# Patient Record
Sex: MALE | Race: WHITE | NOT HISPANIC OR LATINO | URBAN - METROPOLITAN AREA
[De-identification: names, ages, dates, MRNs, and addresses within clinical notes are randomized per-mention and may not be internally consistent; named-entity substitution may affect disease eponyms.]

---

## 2021-12-01 NOTE — PROGRESS NOTES
SUBJECTIVE:                                                    Carloz Palomo is a 51 year old male who presents to clinic today for the following health issues:  New patient to me:     Back Pain      Duration: 6 days        Specific cause: lifting, turning/bending    Description:   Location of pain: low back left  Character of pain: dull ache  Pain radiation:radiates into the left leg  New numbness or weakness in legs, not attributed to pain:  no     Intensity: Currently 8-9/10, At its worst 10/10    History:   Pain interferes with job: YES, not when sitting  History of back problems: no prior back problems  Any previous MRI or X-rays: None  Sees a specialist for back pain:  No  Therapies tried without relief: cold    Alleviating factors:   Improved by: sitting      Precipitating factors:  Worsened by: moving around      Better with sitting worse with walking.   Was moving some items at home and reaching into crawl space.   No previous problems.     Elevated blood pressure at visit today:   Denies any chest pain or shortness of breath.  Denies any headaches dizziness.    Problem list and histories reviewed & adjusted, as indicated.  Additional history: as documented    There is no problem list on file for this patient.    History reviewed. No pertinent surgical history.    Social History     Tobacco Use     Smoking status: Never Smoker     Smokeless tobacco: Never Used   Substance Use Topics     Alcohol use: Yes     History reviewed. No pertinent family history.      Current Outpatient Medications   Medication Sig Dispense Refill     diclofenac (CATAFLAM) 50 MG tablet Take 1 tablet (50 mg) by mouth 3 times daily 90 tablet 0     lisinopril-hydrochlorothiazide (ZESTORETIC) 10-12.5 MG tablet Take 1 tablet by mouth daily 30 tablet 0     methocarbamol (ROBAXIN) 750 MG tablet Take 1 tablet (750 mg) by mouth 3 times daily 30 tablet 0     predniSONE (DELTASONE) 20 MG tablet Take 2 tablets (40 mg) by mouth daily for 5 days  10 tablet 0     No Known Allergies  Problem list, Medication list, Allergies, and Medical/Social/Surgical histories reviewed in [facility] and updated as appropriate.    ROS:  CV: NEGATIVE for chest pain, palpitations or peripheral edema  C: NEGATIVE for fever, chills, change in weight  E/M: NEGATIVE for ear, mouth and throat problems  R: NEGATIVE for significant cough or SOB    OBJECTIVE:                                                    BP (!) 220/120   Pulse 80   Temp 97.2  F (36.2  C) (Tympanic)   Wt 121 kg (266 lb 12.8 oz)   SpO2 98%   There is no height or weight on file to calculate BMI.   GENERAL: healthy, alert, well nourished, well hydrated, no distress  RESP: lungs clear to auscultation - no rales, no rhonchi, no wheezes  CV: regular rates and rhythm, normal S1 S2, no S3 or S4 and no murmur, no click or rub -  ABDOMEN: soft, no tenderness, no  hepatosplenomegaly, no masses, normal bowel sounds  Lumber/Thoracic Spine Exam: Tender:  None, but describes a left lower paraspinal musculature region and injured his left buttock that the pain is coming from.  Range of Motion:  full range of motion doni lower extremities   Strength:  5/5 doni lower extremities   Special tests:  positive left straight leg raise  Hip Exam: Hip ROM full       X-ray lumbar: neg. pending radiology  EKG: NSR, none to compare  Labs pending        ASSESSMENT/PLAN:                                                        ICD-10-CM    1. Acute left-sided low back pain with left-sided sciatica  M54.42 predniSONE (DELTASONE) 20 MG tablet     methocarbamol (ROBAXIN) 750 MG tablet     diclofenac (CATAFLAM) 50 MG tablet     ketorolac (TORADOL) injection 60 mg     XR Lumbar Spine 2/3 Views     [facility] PT and Hand Referral   2. Hypertension goal BP (blood pressure) < 140/90  I10 lisinopril-hydrochlorothiazide (ZESTORETIC) 10-12.5 MG tablet     Lipid panel reflex to direct LDL Fasting     Comprehensive metabolic panel (BMP + Alb, Alk Phos, ALT, AST, Total.  Bili, TP)     CBC with platelets and differential     TSH with free T4 reflex     Lipid panel reflex to direct LDL Fasting     Comprehensive metabolic panel (BMP + Alb, Alk Phos, ALT, AST, Total. Bili, TP)     CBC with platelets and differential     TSH with free T4 reflex     EKG 12-lead complete w/read - Clinics   1.  We talked about physical therapy.  We talked about the use of methocarbamol, diclofenac, prednisone.  We talked about warning signs side effects.  He was given a Toradol shot in clinic today.  ice or cold packs 20 minutes every 2-3 hrs as needed to relieve pain and swelling, for the first 2 days. Then can apply heat 20 minutes every 2-3 hrs (avoid sleeping on heating pad) there after as needed.   Tylenol can help with pain also.    Active range of motion exercises encouraged  Activity modification trying to avoid activities that cause you pain.   2.  His blood pressure is high today.  Labs are pending.  We talked about warning signs and side effects of high blood pressure.  We will start him on medications today.  We will follow-up in 2 weeks.    Alvin Murillo PA-C  [facility]

## 2021-12-02 ENCOUNTER — ANCILLARY PROCEDURE (OUTPATIENT)
Dept: GENERAL RADIOLOGY | Facility: CLINIC | Age: 51
End: 2021-12-02
Attending: PHYSICIAN ASSISTANT
Payer: COMMERCIAL

## 2021-12-02 ENCOUNTER — OFFICE VISIT (OUTPATIENT)
Dept: FAMILY MEDICINE | Facility: CLINIC | Age: 51
End: 2021-12-02
Payer: COMMERCIAL

## 2021-12-02 VITALS
WEIGHT: 266.8 LBS | TEMPERATURE: 97.2 F | OXYGEN SATURATION: 98 % | HEART RATE: 80 BPM | SYSTOLIC BLOOD PRESSURE: 220 MMHG | DIASTOLIC BLOOD PRESSURE: 120 MMHG

## 2021-12-02 DIAGNOSIS — M54.42 ACUTE LEFT-SIDED LOW BACK PAIN WITH LEFT-SIDED SCIATICA: Primary | ICD-10-CM

## 2021-12-02 DIAGNOSIS — I10 HYPERTENSION GOAL BP (BLOOD PRESSURE) < 140/90: ICD-10-CM

## 2021-12-02 LAB
ALBUMIN SERPL-MCNC: 4.5 G/DL (ref 3.4–5)
ALP SERPL-CCNC: 78 U/L (ref 40–150)
ALT SERPL W P-5'-P-CCNC: 46 U/L (ref 0–70)
ANION GAP SERPL CALCULATED.3IONS-SCNC: 5 MMOL/L (ref 3–14)
AST SERPL W P-5'-P-CCNC: 29 U/L (ref 0–45)
BASOPHILS # BLD AUTO: 0.1 10E3/UL (ref 0–0.2)
BASOPHILS NFR BLD AUTO: 1 %
BILIRUB SERPL-MCNC: 0.8 MG/DL (ref 0.2–1.3)
BUN SERPL-MCNC: 15 MG/DL (ref 7–30)
CALCIUM SERPL-MCNC: 8.8 MG/DL (ref 8.5–10.1)
CHLORIDE BLD-SCNC: 107 MMOL/L (ref 94–109)
CHOLEST SERPL-MCNC: 220 MG/DL
CO2 SERPL-SCNC: 28 MMOL/L (ref 20–32)
CREAT SERPL-MCNC: 1 MG/DL (ref 0.66–1.25)
EOSINOPHIL # BLD AUTO: 0.3 10E3/UL (ref 0–0.7)
EOSINOPHIL NFR BLD AUTO: 4 %
ERYTHROCYTE [DISTWIDTH] IN BLOOD BY AUTOMATED COUNT: 13.5 % (ref 10–15)
FASTING STATUS PATIENT QL REPORTED: YES
GFR SERPL CREATININE-BSD FRML MDRD: 86 ML/MIN/1.73M2
GLUCOSE BLD-MCNC: 109 MG/DL (ref 70–99)
HCT VFR BLD AUTO: 47.4 % (ref 40–53)
HDLC SERPL-MCNC: 57 MG/DL
HGB BLD-MCNC: 16 G/DL (ref 13.3–17.7)
LDLC SERPL CALC-MCNC: 140 MG/DL
LYMPHOCYTES # BLD AUTO: 1.5 10E3/UL (ref 0.8–5.3)
LYMPHOCYTES NFR BLD AUTO: 21 %
MCH RBC QN AUTO: 28.8 PG (ref 26.5–33)
MCHC RBC AUTO-ENTMCNC: 33.8 G/DL (ref 31.5–36.5)
MCV RBC AUTO: 85 FL (ref 78–100)
MONOCYTES # BLD AUTO: 0.6 10E3/UL (ref 0–1.3)
MONOCYTES NFR BLD AUTO: 8 %
NEUTROPHILS # BLD AUTO: 4.8 10E3/UL (ref 1.6–8.3)
NEUTROPHILS NFR BLD AUTO: 67 %
NONHDLC SERPL-MCNC: 163 MG/DL
PLATELET # BLD AUTO: 202 10E3/UL (ref 150–450)
POTASSIUM BLD-SCNC: 4.4 MMOL/L (ref 3.4–5.3)
PROT SERPL-MCNC: 7.6 G/DL (ref 6.8–8.8)
RBC # BLD AUTO: 5.56 10E6/UL (ref 4.4–5.9)
SODIUM SERPL-SCNC: 140 MMOL/L (ref 133–144)
TRIGL SERPL-MCNC: 114 MG/DL
TSH SERPL DL<=0.005 MIU/L-ACNC: 1.52 MU/L (ref 0.4–4)
WBC # BLD AUTO: 7.1 10E3/UL (ref 4–11)

## 2021-12-02 PROCEDURE — 93000 ELECTROCARDIOGRAM COMPLETE: CPT | Performed by: PHYSICIAN ASSISTANT

## 2021-12-02 PROCEDURE — 36415 COLL VENOUS BLD VENIPUNCTURE: CPT | Performed by: PHYSICIAN ASSISTANT

## 2021-12-02 PROCEDURE — 96372 THER/PROPH/DIAG INJ SC/IM: CPT | Performed by: PHYSICIAN ASSISTANT

## 2021-12-02 PROCEDURE — 72100 X-RAY EXAM L-S SPINE 2/3 VWS: CPT | Performed by: RADIOLOGY

## 2021-12-02 PROCEDURE — 80061 LIPID PANEL: CPT | Performed by: PHYSICIAN ASSISTANT

## 2021-12-02 PROCEDURE — 99204 OFFICE O/P NEW MOD 45 MIN: CPT | Mod: 25 | Performed by: PHYSICIAN ASSISTANT

## 2021-12-02 PROCEDURE — 80050 GENERAL HEALTH PANEL: CPT | Performed by: PHYSICIAN ASSISTANT

## 2021-12-02 RX ORDER — PREDNISONE 20 MG/1
40 TABLET ORAL DAILY
Qty: 10 TABLET | Refills: 0 | Status: SHIPPED | OUTPATIENT
Start: 2021-12-02 | End: 2021-12-07

## 2021-12-02 RX ORDER — KETOROLAC TROMETHAMINE 30 MG/ML
60 INJECTION, SOLUTION INTRAMUSCULAR; INTRAVENOUS ONCE
Status: COMPLETED | OUTPATIENT
Start: 2021-12-02 | End: 2021-12-02

## 2021-12-02 RX ORDER — DICLOFENAC POTASSIUM 50 MG/1
50 TABLET, FILM COATED ORAL 3 TIMES DAILY
Qty: 90 TABLET | Refills: 0 | Status: SHIPPED | OUTPATIENT
Start: 2021-12-02 | End: 2022-01-01

## 2021-12-02 RX ORDER — METHOCARBAMOL 750 MG/1
750 TABLET, FILM COATED ORAL 3 TIMES DAILY
Qty: 30 TABLET | Refills: 0 | Status: SHIPPED | OUTPATIENT
Start: 2021-12-02 | End: 2021-12-13

## 2021-12-02 RX ORDER — LISINOPRIL/HYDROCHLOROTHIAZIDE 10-12.5 MG
1 TABLET ORAL DAILY
Qty: 30 TABLET | Refills: 0 | Status: SHIPPED | OUTPATIENT
Start: 2021-12-02 | End: 2022-01-04

## 2021-12-02 RX ADMIN — KETOROLAC TROMETHAMINE 60 MG: 30 INJECTION, SOLUTION INTRAMUSCULAR; INTRAVENOUS at 07:41

## 2021-12-02 NOTE — RESULT ENCOUNTER NOTE
Mr. [place],    All of your labs were normal/near normal for you.    Please contact the clinic if you have additional questions.  Thank you.    Sincerely,    Alvin Murillo PA-C

## 2021-12-12 ENCOUNTER — HEALTH MAINTENANCE LETTER (OUTPATIENT)
Age: 51
End: 2021-12-12

## 2021-12-13 ENCOUNTER — MYC REFILL (OUTPATIENT)
Dept: FAMILY MEDICINE | Facility: CLINIC | Age: 51
End: 2021-12-13
Payer: COMMERCIAL

## 2021-12-13 DIAGNOSIS — M54.42 ACUTE LEFT-SIDED LOW BACK PAIN WITH LEFT-SIDED SCIATICA: ICD-10-CM

## 2021-12-13 NOTE — TELEPHONE ENCOUNTER
Routing refill request to provider for review/approval because:  Drug not on the [facility] refill protocol     Consuelo RAMN, RN

## 2021-12-14 RX ORDER — METHOCARBAMOL 750 MG/1
750 TABLET, FILM COATED ORAL 3 TIMES DAILY
Qty: 30 TABLET | Refills: 0 | Status: SHIPPED | OUTPATIENT
Start: 2021-12-14

## 2021-12-15 NOTE — PROGRESS NOTES
Assessment & Plan       ICD-10-CM    1. Hypertension goal BP (blood pressure) < 140/90  I10 lisinopril-hydrochlorothiazide (ZESTORETIC) 20-25 MG tablet   2. Acute left-sided low back pain with left-sided sciatica  M54.42 MR Lumbar Spine w/o Contrast     traMADol (ULTRAM) 50 MG tablet   1.  We will increase his lisinopril hydrochlorothiazide to 20/25 daily.  Recheck his blood pressure in 2 weeks.  Warning signs side effects were discussed.  2.  We will get an MRI of his lumbar spine at patient's request.  He has physical therapy scheduled next week we will have him continue that.  He was given a prescription for tramadol to use.  Warning signs were discussed.    Return in about 2 weeks (around 12/30/2021) for recheck.    TONY Ochoa [facility]    Leena Duron is a 51 year old who presents for the following health issues     History of Present Illness       Back Pain:  He presents for follow up of back pain. Patient's back pain is a new problem.    Original cause of back pain: turning/bending  First noticed back pain: 1-4 weeks ago  Patient feels back pain: constantlyLocation of back pain:  Left lower back and left hip  Description of back pain: dull ache  Back pain spreads: left knee    Since patient first noticed back pain, pain is: unchanged  Does back pain interfere with his job:  No  On a scale of 1-10 (10 being the worst), patient describes pain as:  8  What makes back pain worse: lying down and standing  Acupuncture: not tried  Acetaminophen: not tried  Activity or exercise: not tried  Chiropractor:  Helpful  Cold: helpful  Heat: not tried  Massage: not tried  Muscle relaxants: helpful  NSAIDS: helpful  Opioids: not tried  Physical Therapy: not tried  Rest: not helpful  Steroid Injection: not tried  Stretching: not helpful  Surgery: not tried  TENS unit: not tried  Topical pain relievers: not tried  Other healthcare providers patient is seeing for back pain:  Chiropractor    Hypertension: He presents for follow up of hypertension.  He does not check blood pressure  regularly outside of the clinic. Outside blood pressures have been over 140/90. He does not follow a low salt diet.     He eats 2-3 servings of fruits and vegetables daily.He consumes 3 sweetened beverage(s) daily.He exercises with enough effort to increase his heart rate 9 or less minutes per day.  He exercises with enough effort to increase his heart rate 3 or less days per week.   He is taking medications regularly.     He states he has been taking the medication as prescribed and has taken little better but continues to have a lot of pain down his left leg.  He states the lower back pain is gotten much better.  He states he can hardly walk because the pain radiates down his left leg and stops right as needed.  He states he is having a lot of pain hard time sleeping only getting a couple hours a night.    Review of Systems   Constitutional, HEENT, cardiovascular, pulmonary, gi and gu systems are negative, except as otherwise noted.      Objective    BP (!) 140/100   Pulse 73   Temp 97.7  F (36.5  C) (Tympanic)   Wt 116.6 kg (257 lb)   SpO2 98%   There is no height or weight on file to calculate BMI.  Physical Exam   GENERAL: healthy, alert and no distress-appears in a lot of pain.  He is unable to sit still in his seat yes that shift a lot.  RESP: lungs clear to auscultation - no rales, rhonchi or wheezes  CV: regular rate and rhythm, normal S1 S2, no S3 or S4, no murmur, click or rub, no peripheral edema and peripheral pulses strong  ABDOMEN: soft, nontender, no hepatosplenomegaly, no masses and bowel sounds normal  On back exam he has full range of motion with rotation of his spine with no pain.  He does not elicit any tenderness.  He has pain with laying on his back.  He has full range of motion bilateral lower extremities with 5-5 strength.  He has positive straight leg raise on the left.  Calves  are soft nontender neuro vas intact distally.  He has stiffness bilaterally in his internal extra rotation of his femur.  He has a negative exam of his knee no tenderness ligaments stable valgus varus and Lachman's.  He has a negative Porfirio's.

## 2021-12-16 ENCOUNTER — OFFICE VISIT (OUTPATIENT)
Dept: FAMILY MEDICINE | Facility: CLINIC | Age: 51
End: 2021-12-16
Payer: COMMERCIAL

## 2021-12-16 VITALS
TEMPERATURE: 97.7 F | DIASTOLIC BLOOD PRESSURE: 100 MMHG | SYSTOLIC BLOOD PRESSURE: 140 MMHG | OXYGEN SATURATION: 98 % | WEIGHT: 257 LBS | HEART RATE: 73 BPM

## 2021-12-16 DIAGNOSIS — M54.42 ACUTE LEFT-SIDED LOW BACK PAIN WITH LEFT-SIDED SCIATICA: ICD-10-CM

## 2021-12-16 DIAGNOSIS — I10 HYPERTENSION GOAL BP (BLOOD PRESSURE) < 140/90: Primary | ICD-10-CM

## 2021-12-16 PROCEDURE — 99214 OFFICE O/P EST MOD 30 MIN: CPT | Performed by: PHYSICIAN ASSISTANT

## 2021-12-16 RX ORDER — TRAMADOL HYDROCHLORIDE 50 MG/1
50 TABLET ORAL 3 TIMES DAILY PRN
Qty: 10 TABLET | Refills: 0 | Status: SHIPPED | OUTPATIENT
Start: 2021-12-16 | End: 2021-12-19

## 2021-12-16 RX ORDER — LISINOPRIL AND HYDROCHLOROTHIAZIDE 20; 25 MG/1; MG/1
1 TABLET ORAL DAILY
Qty: 30 TABLET | Refills: 0 | Status: SHIPPED | OUTPATIENT
Start: 2021-12-16 | End: 2022-01-07

## 2021-12-16 ASSESSMENT — PAIN SCALES - GENERAL: PAINLEVEL: EXTREME PAIN (8)

## 2021-12-19 NOTE — PROGRESS NOTES
Physical Therapy Initial Examination/Evaluation  December 20, 2021    Therapist Impression: Carloz is a 51 year old year old male referred to physical therapy by Alvin Murillo PA-C for treatment of acute sided low back pain with left sided sciatica. Patient presents to physical therapy with c/o low back pain with radicular symptoms. Signs and symptoms are consistent with sub-acute lumbar radiculopathy. Due to these impairments, patient is unable to walk desired distance, stand up straight, stand prolonged periods, and ambulate up stairs without fear of falling. Patient will benefit from skilled PT in order to address impairments/limitations in order to return patient to goals and prior level of function.       Subjective:  Presenting Complaint Left sided low back pain, hip pain down to knee   Mechanism of Injury  Crawling in crawl space    DOI (onset)/ DOS 11/29/21   Functional Limitations Standing, walking, stairs, sleeping   Notable PMH See Epic chart    Prior treatment Ibuprofen and Perscription anti-inflammatory medications  fair   Prior Imaging  MRI scheduled for tomorrow 12/20/21    X-ray:                                                                  IMPRESSION: Vertebral body heights are normal. Posterior alignment is normal. Minimal degenerative changes noted.       Pain/Presentation    Pain Level   Rest: 8/10  ; Activity: 10/10   Location   left low back and left leg down to knee    Frequency Constant; worse at times but never goes away   Described as Dull ache     Alleviated by  Ibuprofen and Perscription anti-inflammatory medications, sitting    Progression of Sx Gradually getting better.   Aggravating Factors  Turning, bending, lifting, laying down, standing    Sleeping  Interrupted due to current issue- currently sleeping on couch instead of bed       Social Factors/Lifestyle  Occupation and Duties    prolonged sitting, keyboarding/computer use, repetitive tasks   Barriers at home/work None  as reported by patient   Medications ibuprofen (3x/day) ; muscle relaxer    Current HEP/Exercise Walking around golf course (currently able to walk 1 block currently)   Patient Reported Health Good      Patient Goals:  1) get back to walking as normal  2) no pain with standing up straight   3) decrease back pain so he can golf in spring     Other factors/PMH that may impact care: na        Patient Health History  Carloz Palomo being seen for Back hip and knee pain.     Problem began: 11/29/2021.   Problem occurred: Moving items in a crawl space   Pain is reported as 8/10 on pain scale.  General health as reported by patient is good.  Pertinent medical history includes: high blood pressure and overweight.     Medical allergies: none.   Surgeries include:  None.    Current medications:  Anti-inflammatory, high blood pressure medication and muscle relaxants.       Primary job tasks include:  Computer work and prolonged sitting.                                    Lumbar Spine Evaluation  Posture: slumped posturing; increase lumbar and thoracic kyphosis in sitting and standing; unable to stand upright due to pain       Dermatome Testing: Negative      Myotome Testing:  Negative    Motor Deficit:  Myotomes L R   L1-2 (hip flexion) 3/5 5/5   L3 (knee extension) 4/5 5/5   L4 (ankle DF) 5/5 5/5   L5 (g. toe ext) 5/5 5/5   S1 (ankle PF or knee flex) 5/5 5/5      Sensory Deficit, Reflexes: WNL    Dynamic Movement Screen  Single leg stance observations: No significant findings for eyes open/closed  Double limb squat observations: Anterior knee translation, Hip internal rotation, Increased trunk lean and Narrow REN  Single limb squat observations: Not assessed  Gait: anterior trunk lean through all phases of gait, decreased hip extension    Hip Joint Screen Negative    Trunk Range of Motion  Movement Loss Major Moderate Minimal Nil Pain   Flexion   x  Increases left leg radicular sx   Extension x    Unable to get to neutral  before sx   Rotation R   x  Left sided back pain    Rotation L   x  Left sided back pain      Tested Movements  Directional Preference: tendency toward extension preference in supine   Repeated Extension Test: did not perform due to radicular symptoms; will look at next visit       Trunk Strength  Difficulty with TrA activation in supine with cueing       Neural Tension:   Slump and SLR next visit if symptoms have calmed down     Special Tests  Spring testing: unable to assess as prone position was not tolerated       Palpation:  Moderate tenderness to palpation at L2-4 paraspinals in standing; unable to fully assess as patient did not tolerate prone positioning         System    Physical Exam    General     ROS    Assessment/Plan:    Patient is a 51 year old male with lumbar complaints.    Patient has the following significant findings with corresponding treatment plan.                Diagnosis 1:  Lumbar radiculopathy  Pain -  hot/cold therapy, electric stimulation, mechanical traction and manual therapy  Decreased ROM/flexibility - manual therapy and therapeutic exercise  Decreased joint mobility - manual therapy and therapeutic exercise  Decreased strength - therapeutic exercise and therapeutic activities  Impaired gait - gait training  Impaired muscle performance - neuro re-education  Decreased function - therapeutic activities  Impaired posture - neuro re-education    Therapy Evaluation Codes:     Cumulative Therapy Evaluation is: Low complexity.    Previous and current functional limitations:  (See Goal Flow Sheet for this information)    Short term and Long term goals: (See Goal Flow Sheet for this information)     Communication ability:  Patient appears to be able to clearly communicate and understand verbal and written communication and follow directions correctly.  Treatment Explanation - The following has been discussed with the patient:   RX ordered/plan of care  Anticipated outcomes  Possible risks and  side effects  This patient would benefit from PT intervention to resume normal activities.   Rehab potential is good.    Frequency:  1 X week, once daily  Duration:  for 8 weeks  Discharge Plan:  Achieve all LTG.  Independent in home treatment program.  Reach maximal therapeutic benefit.    Please refer to the daily flowsheet for treatment today, total treatment time and time spent performing 1:1 timed codes.

## 2021-12-20 ENCOUNTER — THERAPY VISIT (OUTPATIENT)
Dept: PHYSICAL THERAPY | Facility: CLINIC | Age: 51
End: 2021-12-20
Attending: PHYSICIAN ASSISTANT
Payer: COMMERCIAL

## 2021-12-20 DIAGNOSIS — M54.42 ACUTE LEFT-SIDED LOW BACK PAIN WITH LEFT-SIDED SCIATICA: ICD-10-CM

## 2021-12-20 PROCEDURE — 97161 PT EVAL LOW COMPLEX 20 MIN: CPT | Mod: GP

## 2021-12-20 PROCEDURE — 97110 THERAPEUTIC EXERCISES: CPT | Mod: GP

## 2021-12-21 ENCOUNTER — ANCILLARY PROCEDURE (OUTPATIENT)
Dept: MRI IMAGING | Facility: CLINIC | Age: 51
End: 2021-12-21
Attending: PHYSICIAN ASSISTANT
Payer: COMMERCIAL

## 2021-12-21 DIAGNOSIS — M54.42 ACUTE LEFT-SIDED LOW BACK PAIN WITH LEFT-SIDED SCIATICA: ICD-10-CM

## 2021-12-21 PROCEDURE — 72148 MRI LUMBAR SPINE W/O DYE: CPT | Mod: TC | Performed by: RADIOLOGY

## 2021-12-21 NOTE — RESULT ENCOUNTER NOTE
Mr. Palomo,    Here is a copy of your MRI:  I would have you start PHYSICAL THERAPY as we discussed and also follow up  with the sport medicine providers. They will call you to schedule.     In general you have a herniated disk in you back.     IMPRESSION:  1. Multilevel degenerative changes of the lumbar spine, as described.  2. At L3-L4, there is a cranial migrating left foraminal disc  extrusion with mass effect upon and posterior displacement of the  exiting left L3 nerve root. Correlate clinically for possible left L3  radiculopathy.  3. No high-grade central spinal canal stenosis.  4. Varying degrees of multilevel neural foraminal stenosis elsewhere,  including moderate bilateral L4-L5 neural foraminal stenosis and  lesser degrees of neural foraminal narrowing at other levels. Please  see the body of the report for additional details.      Please contact the clinic if you have additional questions.  Thank you.    Sincerely,    Alvin Murillo PA-C

## 2021-12-27 ENCOUNTER — THERAPY VISIT (OUTPATIENT)
Dept: PHYSICAL THERAPY | Facility: CLINIC | Age: 51
End: 2021-12-27
Payer: COMMERCIAL

## 2021-12-27 ENCOUNTER — OFFICE VISIT (OUTPATIENT)
Dept: ORTHOPEDICS | Facility: CLINIC | Age: 51
End: 2021-12-27
Attending: PHYSICIAN ASSISTANT
Payer: COMMERCIAL

## 2021-12-27 VITALS — HEIGHT: 72 IN | WEIGHT: 260 LBS | BODY MASS INDEX: 35.21 KG/M2

## 2021-12-27 DIAGNOSIS — M54.42 ACUTE LEFT-SIDED LOW BACK PAIN WITH LEFT-SIDED SCIATICA: Primary | ICD-10-CM

## 2021-12-27 DIAGNOSIS — M54.42 ACUTE LEFT-SIDED LOW BACK PAIN WITH LEFT-SIDED SCIATICA: ICD-10-CM

## 2021-12-27 DIAGNOSIS — M54.16 LUMBAR RADICULOPATHY, ACUTE: Primary | ICD-10-CM

## 2021-12-27 PROCEDURE — 99204 OFFICE O/P NEW MOD 45 MIN: CPT | Performed by: ORTHOPAEDIC SURGERY

## 2021-12-27 PROCEDURE — 97112 NEUROMUSCULAR REEDUCATION: CPT | Mod: GP

## 2021-12-27 PROCEDURE — 97110 THERAPEUTIC EXERCISES: CPT | Mod: GP

## 2021-12-27 ASSESSMENT — ENCOUNTER SYMPTOMS
MYALGIAS: 0
MEMORY LOSS: 0
WEAKNESS: 0
MUSCLE WEAKNESS: 0
NUMBNESS: 0
PARALYSIS: 0
SEIZURES: 0
DIZZINESS: 0
LOSS OF CONSCIOUSNESS: 0
HEMATURIA: 0
TREMORS: 0
STIFFNESS: 0
MUSCLE CRAMPS: 0
NECK PAIN: 0
DYSURIA: 0
TINGLING: 0
DISTURBANCES IN COORDINATION: 0
HEADACHES: 0
ARTHRALGIAS: 0
JOINT SWELLING: 0
BACK PAIN: 1
SPEECH CHANGE: 0

## 2021-12-27 ASSESSMENT — MIFFLIN-ST. JEOR: SCORE: 2060.6

## 2021-12-27 NOTE — NURSING NOTE
"Reason For Visit:   Chief Complaint   Patient presents with     Consult     Acute left-sided low back pain with left-sided sciatica/Alvin Murillo PA-C        Primary MD: No Ref-Primary, Physician  Ref. MD: Alvin Murillo PA-C    ?  No  Occupation .  Currently working? Yes.  Work status?  Full time.    Date of injury: 11/2021  Type of injury: possible push pull injury.    Date of surgery: NO  Type of surgery: No.    Smoker: No  Request smoking cessation information: No    Ht 1.826 m (5' 11.89\")   Wt 117.9 kg (260 lb)   BMI 35.37 kg/m      Pain Assessment  Patient Currently in Pain: Yes  0-10 Pain Scale: 5  Primary Pain Location: Back    Oswestry (DESTINY) Questionnaire    OSWESTRY DISABILITY INDEX 12/27/2021   Count 9   Sum 20   Oswestry Score (%) 44.44        Visual Analog Pain Scale  Back Pain Scale 0-10: 5  Right leg pain: 0  Left leg pain: 5  Neck Pain Scale 0-10: 0  Right arm pain: 0  Left arm pain: 0    Promis 10 Assessment    PROMIS 10 12/27/2021   In general, would you say your health is: Good   In general, would you say your quality of life is: Fair   In general, how would you rate your physical health? Good   In general, how would you rate your mental health, including your mood and your ability to think? Very good   In general, how would you rate your satisfaction with your social activities and relationships? Fair   In general, please rate how well you carry out your usual social activities and roles Fair   To what extent are you able to carry out your everyday physical activities such as walking, climbing stairs, carrying groceries, or moving a chair? A little   How often have you been bothered by emotional problems such as feeling anxious, depressed or irritable? Rarely   How would you rate your fatigue on average? Mild   How would you rate your pain on average?   0 = No Pain  to  10 = Worst Imaginable Pain 6   In general, would you say your health is: 3   In general, " would you say your quality of life is: 2   In general, how would you rate your physical health? 3   In general, how would you rate your mental health, including your mood and your ability to think? 4   In general, how would you rate your satisfaction with your social activities and relationships? 2   In general, please rate how well you carry out your usual social activities and roles. (This includes activities at home, at work and in your community, and responsibilities as a parent, child, spouse, employee, friend, etc.) 2   To what extent are you able to carry out your everyday physical activities such as walking, climbing stairs, carrying groceries, or moving a chair? 2   In the past 7 days, how often have you been bothered by emotional problems such as feeling anxious, depressed, or irritable? 2   In the past 7 days, how would you rate your fatigue on average? 2   In the past 7 days, how would you rate your pain on average, where 0 means no pain, and 10 means worst imaginable pain? 6   Global Mental Health Score 12   Global Physical Health Score 12   PROMIS TOTAL - SUBSCORES 24                Eladia Hernandez LPN

## 2021-12-27 NOTE — LETTER
12/27/2021         RE: Carloz Palomo  [address] [facility] [address]        Dear Colleague,    Thank you for referring your patient, Carloz Palomo, to the [facility]. Please see a copy of my visit note below.    Spine Surgery Consultation    REFERRING PHYSICIAN: Alvin Murillo   PRIMARY CARE PHYSICIAN: No Ref-Primary, Physician           Chief Complaint:   Consult (Acute left-sided low back pain with left-sided sciatica/Oppel, Alvin Phoenix, TOYN )      History of Present Illness:  Symptom Profile Including: location of symptoms, onset, severity, exacerbating/alleviating factors, previous treatments:        Carloz Palomo is a 51 year old male who presents today for evaluation of left-sided lumbar radiculopathy.  He states that since the beginning of November he had acute onset of back pain radiating down the left leg.  Pain radiated from buttock to lateral thigh down to the anterior aspect of left knee.  Did not go beyond the knee.  Symptoms were exacerbated with standing, walking and relieved consistently with sitting.  Prior to onset he was walking 3 miles per day but since then has been limited to approximately 1 minute of walking.    Since initial onset he has used scheduled NSAIDs, intermittent muscle relaxants, and is now using only over-the-counter ibuprofen with reasonable relief.  He did have a x-rays December 2 and MRI December 21.  He has had several appointments already with directed physical therapy and feels his symptoms have started to improve over the last 7 to 10 days.      He denies saddle anesthesia, bowel or bladder incontinence, urinary retention     past treatments tried:  - Physical therapy: Currently in directed physical therapy  - Injections: Has not had injection  - Medications: NSAID, muscle relaxant             Past Medical History:     Patient Active Problem List   Diagnosis     Acute left-sided low back pain with left-sided sciatica              " Past Surgical History:   No history of spine surgery         Social History:     Social History     Tobacco Use     Smoking status: Never Smoker     Smokeless tobacco: Never Used   Substance Use Topics     Alcohol use: Yes            Family History:   No family history on file.         Allergies:   No Known Allergies         Medications:     Current Outpatient Medications   Medication     diclofenac (CATAFLAM) 50 MG tablet     lisinopril-hydrochlorothiazide (ZESTORETIC) 10-12.5 MG tablet     lisinopril-hydrochlorothiazide (ZESTORETIC) 20-25 MG tablet     methocarbamol (ROBAXIN) 750 MG tablet     No current facility-administered medications for this visit.             Review of Systems:     A 10 point ROS was performed and reviewed. Specific responses to these questions are noted at the end of the document.         Physical Exam:     PHYSICAL EXAM:   Constitutional - Patient is healthy, well-nourished and appears stated age, is in no acute distress    Vitals: Ht 1.826 m (5' 11.89\")   Wt 117.9 kg (260 lb)   BMI 35.37 kg/m     Respiratory - Patient is breathing normally, no audible wheeze or respiratory distress.   Skin - No suspicious rashes or lesions.   Psychiatric - Normal mood and affect.   Cardiovascular - Extremities warm and well perfused.   GI - No abdominal distention.   Musculoskeletal - Non-antalgic gait without use of assistive devices.  Minimal tenderness palpation over the lower left paraspinal muscles and PSIS.  He is able to walk without antalgic gait.  Can perform toe walk and heel walk bilaterally.  5 -/5 strength with left hip flexion otherwise he is 5/5 throughout all lower extremity myotomes.  Sensation is grossly intact from L3-S1 bilaterally.  2+ patellar tendon and Achilles tendon reflex.  1 beat clonus bilaterally    Weakly positive straight leg raise in the left recreates L4 radiculopathy               Imaging:   We ordered and independently reviewed new radiographs at this clinic visit. " The results were discussed with the patient.  Findings include:    AP and lateral radiographs are unremarkable without notable degenerative disc space collapse or spondylolisthesis    MRI of the lumbar spine notable for relatively maintained disc base without significant disc desiccation.  At L3-4 there is bilateral paracentral disc herniation and moderate facet effusion, moderate to severe bilateral neuroforaminal stenosis left greater than right. Mild to moderate lateral recess stenosis with traversing left L4 nerve root contacting disc in lateral recess.  At L4-5 disc bulge bilaterally resulting in moderate to severe foraminal stenosis compressing exiting L4 nerve root on left.              Assessment and Plan:   Assessment and Plan:  51 year old male with left-sided L4 radiculopathy since beginning of November.  He reports over the last 7 to 10 days his symptoms have begun improving.  He is currently in physical therapy and taking anti-inflammatories as needed.  We discussed that if his symptoms worsen we can explore gabapentin or epidural steroid injection.  Given location was discrimination would recommend an L4-5 translaminar epidural steroid injection.  We will arrange for virtual visit in 4 weeks to follow-up on symptom relief.  Encouraged him to call back prior to that or send [employer] message if symptoms are worsening he would like to get referral for epidural steroid injection.    Time spent on this clinical encounter including previsit chart review, history and physical examination, patient counseling and documentation was 45 minutes on the date of encounter.            Respectfully,  Ariel Jones MD  Spine Surgery  [facility]        Answers for HPI/ROS submitted by the patient on 12/27/2021  General Symptoms: No  Skin Symptoms: No  HENT Symptoms: No  EYE SYMPTOMS: No  HEART SYMPTOMS: No  LUNG SYMPTOMS: No  INTESTINAL SYMPTOMS: No  URINARY SYMPTOMS: Yes  REPRODUCTIVE SYMPTOMS:  No  SKELETAL SYMPTOMS: Yes  BLOOD SYMPTOMS: No  NERVOUS SYSTEM SYMPTOMS: Yes  MENTAL HEALTH SYMPTOMS: No  Trouble holding urine or incontinence: No  Pain or burning: No  Trouble starting or stopping: No  Increased frequency of urination: Yes  Blood in urine: No  Decreased frequency of urination: No  Frequent nighttime urination: No  Back pain: Yes  Muscle aches: No  Neck pain: No  Swollen joints: No  Joint pain: No  Bone pain: No  Muscle cramps: No  Muscle weakness: No  Joint stiffness: No  Bone fracture: No  Trouble with coordination: No  Dizziness or trouble with balance: No  Fainting or black-out spells: No  Memory loss: No  Headache: No  Seizures: No  Speech problems: No  Tingling: No  Tremor: No  Weakness: No  Difficulty walking: Yes  Paralysis: No  Numbness: No

## 2021-12-27 NOTE — PROGRESS NOTES
Spine Surgery Consultation    REFERRING PHYSICIAN: Alvin Murillo   PRIMARY CARE PHYSICIAN: No Ref-Primary, Physician           Chief Complaint:   Consult (Acute left-sided low back pain with left-sided sciatica/Chidi, Alvin Phoenix PA-C )      History of Present Illness:  Symptom Profile Including: location of symptoms, onset, severity, exacerbating/alleviating factors, previous treatments:        Carloz Palomo is a 51 year old male who presents today for evaluation of left-sided lumbar radiculopathy.  He states that since the beginning of November he had acute onset of back pain radiating down the left leg.  Pain radiated from buttock to lateral thigh down to the anterior aspect of left knee.  Did not go beyond the knee.  Symptoms were exacerbated with standing, walking and relieved consistently with sitting.  Prior to onset he was walking 3 miles per day but since then has been limited to approximately 1 minute of walking.    Since initial onset he has used scheduled NSAIDs, intermittent muscle relaxants, and is now using only over-the-counter ibuprofen with reasonable relief.  He did have a x-rays December 2 and MRI December 21.  He has had several appointments already with directed physical therapy and feels his symptoms have started to improve over the last 7 to 10 days.      He denies saddle anesthesia, bowel or bladder incontinence, urinary retention     past treatments tried:  - Physical therapy: Currently in directed physical therapy  - Injections: Has not had injection  - Medications: NSAID, muscle relaxant             Past Medical History:     Patient Active Problem List   Diagnosis     Acute left-sided low back pain with left-sided sciatica              Past Surgical History:   No history of spine surgery         Social History:     Social History     Tobacco Use     Smoking status: Never Smoker     Smokeless tobacco: Never Used   Substance Use Topics     Alcohol use: Yes            Family  "History:   No family history on file.         Allergies:   No Known Allergies         Medications:     Current Outpatient Medications   Medication     diclofenac (CATAFLAM) 50 MG tablet     lisinopril-hydrochlorothiazide (ZESTORETIC) 10-12.5 MG tablet     lisinopril-hydrochlorothiazide (ZESTORETIC) 20-25 MG tablet     methocarbamol (ROBAXIN) 750 MG tablet     No current facility-administered medications for this visit.             Review of Systems:     A 10 point ROS was performed and reviewed. Specific responses to these questions are noted at the end of the document.         Physical Exam:     PHYSICAL EXAM:   Constitutional - Patient is healthy, well-nourished and appears stated age, is in no acute distress    Vitals: Ht 1.826 m (5' 11.89\")   Wt 117.9 kg (260 lb)   BMI 35.37 kg/m     Respiratory - Patient is breathing normally, no audible wheeze or respiratory distress.   Skin - No suspicious rashes or lesions.   Psychiatric - Normal mood and affect.   Cardiovascular - Extremities warm and well perfused.   GI - No abdominal distention.   Musculoskeletal - Non-antalgic gait without use of assistive devices.  Minimal tenderness palpation over the lower left paraspinal muscles and PSIS.  He is able to walk without antalgic gait.  Can perform toe walk and heel walk bilaterally.  5 -/5 strength with left hip flexion otherwise he is 5/5 throughout all lower extremity myotomes.  Sensation is grossly intact from L3-S1 bilaterally.  2+ patellar tendon and Achilles tendon reflex.  1 beat clonus bilaterally    Weakly positive straight leg raise in the left recreates L4 radiculopathy               Imaging:   We ordered and independently reviewed new radiographs at this clinic visit. The results were discussed with the patient.  Findings include:    AP and lateral radiographs are unremarkable without notable degenerative disc space collapse or spondylolisthesis    MRI of the lumbar spine notable for relatively maintained " disc base without significant disc desiccation.  At L3-4 there is bilateral paracentral disc herniation and moderate facet effusion, moderate to severe bilateral neuroforaminal stenosis left greater than right. Mild to moderate lateral recess stenosis with traversing left L4 nerve root contacting disc in lateral recess.  At L4-5 disc bulge bilaterally resulting in moderate to severe foraminal stenosis compressing exiting L4 nerve root on left.              Assessment and Plan:   Assessment and Plan:  51 year old male with left-sided L4 radiculopathy since beginning of November.  He reports over the last 7 to 10 days his symptoms have begun improving.  He is currently in physical therapy and taking anti-inflammatories as needed.  We discussed that if his symptoms worsen we can explore gabapentin or epidural steroid injection.  Given location was discrimination would recommend an L4-5 translaminar epidural steroid injection.  We will arrange for virtual visit in 4 weeks to follow-up on symptom relief.  Encouraged him to call back prior to that or send [employer] message if symptoms are worsening he would like to get referral for epidural steroid injection.    Time spent on this clinical encounter including previsit chart review, history and physical examination, patient counseling and documentation was 45 minutes on the date of encounter.            Respectfully,  Ariel Jones MD  Spine Surgery  [facility]        Answers for HPI/ROS submitted by the patient on 12/27/2021  General Symptoms: No  Skin Symptoms: No  HENT Symptoms: No  EYE SYMPTOMS: No  HEART SYMPTOMS: No  LUNG SYMPTOMS: No  INTESTINAL SYMPTOMS: No  URINARY SYMPTOMS: Yes  REPRODUCTIVE SYMPTOMS: No  SKELETAL SYMPTOMS: Yes  BLOOD SYMPTOMS: No  NERVOUS SYSTEM SYMPTOMS: Yes  MENTAL HEALTH SYMPTOMS: No  Trouble holding urine or incontinence: No  Pain or burning: No  Trouble starting or stopping: No  Increased frequency of urination:  Yes  Blood in urine: No  Decreased frequency of urination: No  Frequent nighttime urination: No  Back pain: Yes  Muscle aches: No  Neck pain: No  Swollen joints: No  Joint pain: No  Bone pain: No  Muscle cramps: No  Muscle weakness: No  Joint stiffness: No  Bone fracture: No  Trouble with coordination: No  Dizziness or trouble with balance: No  Fainting or black-out spells: No  Memory loss: No  Headache: No  Seizures: No  Speech problems: No  Tingling: No  Tremor: No  Weakness: No  Difficulty walking: Yes  Paralysis: No  Numbness: No

## 2022-01-03 ENCOUNTER — THERAPY VISIT (OUTPATIENT)
Dept: PHYSICAL THERAPY | Facility: CLINIC | Age: 52
End: 2022-01-03
Payer: COMMERCIAL

## 2022-01-03 DIAGNOSIS — M54.42 ACUTE LEFT-SIDED LOW BACK PAIN WITH LEFT-SIDED SCIATICA: ICD-10-CM

## 2022-01-03 PROCEDURE — 97112 NEUROMUSCULAR REEDUCATION: CPT | Mod: GP

## 2022-01-03 PROCEDURE — 97110 THERAPEUTIC EXERCISES: CPT | Mod: GP

## 2022-01-04 ENCOUNTER — OFFICE VISIT (OUTPATIENT)
Dept: FAMILY MEDICINE | Facility: CLINIC | Age: 52
End: 2022-01-04
Payer: COMMERCIAL

## 2022-01-04 VITALS
SYSTOLIC BLOOD PRESSURE: 132 MMHG | DIASTOLIC BLOOD PRESSURE: 92 MMHG | HEIGHT: 71 IN | BODY MASS INDEX: 36.68 KG/M2 | HEART RATE: 89 BPM | OXYGEN SATURATION: 98 % | WEIGHT: 262 LBS | TEMPERATURE: 98.2 F

## 2022-01-04 DIAGNOSIS — K61.0 PERIANAL ABSCESS: Primary | ICD-10-CM

## 2022-01-04 PROCEDURE — 99213 OFFICE O/P EST LOW 20 MIN: CPT | Performed by: FAMILY MEDICINE

## 2022-01-04 ASSESSMENT — MIFFLIN-ST. JEOR: SCORE: 2055.55

## 2022-01-04 NOTE — PROGRESS NOTES
"  Assessment & Plan     (K61.0) Perianal abscess  (primary encounter diagnosis)  Comment: not fluctuant  Plan: amoxicillin-clavulanate (AUGMENTIN) 875-125 MG         tablet        Start augmentin today, unlikely to benefit from I&D                BMI:   Estimated body mass index is 36.54 kg/m  as calculated from the following:    Height as of this encounter: 1.803 m (5' 11\").    Weight as of this encounter: 118.8 kg (262 lb).   Weight management plan: Discussed healthy diet and exercise guidelines    Patient Instructions     Start augmentin twice a day for 7 days, try to get 2 doses in today.        Return in about 3 days (around 1/7/2022) for if not improved or symptoms worsen.    Claudia Vazquez MD  [facility] DANIELLE Duron is a 51 year old who presents for the following health issues     HPI     Abscess by anus noticed it two days ago.     Gallo,CMA    Started as pain noted with wiping after BM, noted a lump in same area. No bleeding or discharge from lump.  + ttp.  No f/c/s          Review of Systems   Constitutional, HEENT, cardiovascular, pulmonary, gi and gu systems are negative, except as otherwise noted.      Objective    BP (!) 132/92   Pulse 89   Temp 98.2  F (36.8  C) (Tympanic)   Ht 1.803 m (5' 11\")   Wt 118.8 kg (262 lb)   SpO2 98%   BMI 36.54 kg/m    Body mass index is 36.54 kg/m .  Physical Exam   GENERAL: healthy, alert and no distress  EYES: Eyes grossly normal to inspection, PERRL and conjunctivae and sclerae normal  RECTAL (male): normal sphincter tone, no rectal masses and + 1 cm area of induration at 7:00 position in prone position, no fluctuance, + ttp.    MS: no gross musculoskeletal defects noted, no edema  SKIN: no suspicious lesions or rashes  PSYCH: mentation appears normal, affect normal/bright                "

## 2022-01-06 NOTE — PROGRESS NOTES
"  Assessment & Plan       ICD-10-CM    1. Hypertension goal BP (blood pressure) < 140/90  I10 lisinopril-hydrochlorothiazide (ZESTORETIC) 20-25 MG tablet   2. Morbid obesity (H)  E66.01      medical conditions are stable. meds refilled.  Work on Healthy diet and exercise. Getting heart rate elevated for 30 mins most days of week.      Return in about 6 months (around 7/7/2022) for BP Recheck.    TONY Ochoa [facility] DANIELLE Duron is a 51 year old who presents for the following health issues     HPI     Hypertension Follow-up      Do you check your blood pressure regularly outside of the clinic? No     Are you following a low salt diet? No    Are your blood pressures ever more than 140 on the top number (systolic) OR more   than 90 on the bottom number (diastolic), for example 140/90? Unsure       Review of Systems   Constitutional, HEENT, cardiovascular, pulmonary, gi and gu systems are negative, except as otherwise noted.      Objective    /84   Pulse 78   Temp 98.4  F (36.9  C) (Tympanic)   Resp 16   Ht 1.803 m (5' 11\")   Wt 116.6 kg (257 lb)   SpO2 99%   BMI 35.84 kg/m    Body mass index is 35.84 kg/m .  Physical Exam   GENERAL: healthy, alert and no distress  RESP: lungs clear to auscultation - no rales, rhonchi or wheezes  CV: regular rate and rhythm, normal S1 S2, no S3 or S4, no murmur, click or rub, no peripheral edema and peripheral pulses strong  MS: no gross musculoskeletal defects noted, no edema        The 10-year ASCVD risk score ([place] OTTO Jr., et al., 2013) is: 6%    Values used to calculate the score:      Age: 51 years      Sex: Male      Is Non- : No      Diabetic: No      Tobacco smoker: No      Systolic Blood Pressure: 136 mmHg      Is BP treated: Yes      HDL Cholesterol: 57 mg/dL      Total Cholesterol: 220 mg/dL   "

## 2022-01-07 ENCOUNTER — OFFICE VISIT (OUTPATIENT)
Dept: FAMILY MEDICINE | Facility: CLINIC | Age: 52
End: 2022-01-07
Payer: COMMERCIAL

## 2022-01-07 VITALS
OXYGEN SATURATION: 99 % | BODY MASS INDEX: 35.98 KG/M2 | HEIGHT: 71 IN | TEMPERATURE: 98.4 F | DIASTOLIC BLOOD PRESSURE: 84 MMHG | RESPIRATION RATE: 16 BRPM | WEIGHT: 257 LBS | HEART RATE: 78 BPM | SYSTOLIC BLOOD PRESSURE: 136 MMHG

## 2022-01-07 DIAGNOSIS — I10 HYPERTENSION GOAL BP (BLOOD PRESSURE) < 140/90: ICD-10-CM

## 2022-01-07 DIAGNOSIS — E66.01 MORBID OBESITY (H): ICD-10-CM

## 2022-01-07 PROCEDURE — 99213 OFFICE O/P EST LOW 20 MIN: CPT | Performed by: PHYSICIAN ASSISTANT

## 2022-01-07 RX ORDER — LISINOPRIL AND HYDROCHLOROTHIAZIDE 20; 25 MG/1; MG/1
1 TABLET ORAL DAILY
Qty: 90 TABLET | Refills: 1 | Status: SHIPPED | OUTPATIENT
Start: 2022-01-07 | End: 2022-07-11

## 2022-01-07 ASSESSMENT — PAIN SCALES - GENERAL: PAINLEVEL: NO PAIN (0)

## 2022-01-07 ASSESSMENT — MIFFLIN-ST. JEOR: SCORE: 2032.87

## 2022-01-07 NOTE — PATIENT INSTRUCTIONS
Exercise Apps:  [email or web address]  Runtastic  N[employer] Plus Running  7 Minute workout  Couch to 5K    Diet Apps:  [email or web address]  [email or web address]  Weight watchers    21 days fix program.

## 2022-01-13 ENCOUNTER — THERAPY VISIT (OUTPATIENT)
Dept: PHYSICAL THERAPY | Facility: CLINIC | Age: 52
End: 2022-01-13
Payer: COMMERCIAL

## 2022-01-13 DIAGNOSIS — M54.42 ACUTE LEFT-SIDED LOW BACK PAIN WITH LEFT-SIDED SCIATICA: ICD-10-CM

## 2022-01-13 PROCEDURE — 97110 THERAPEUTIC EXERCISES: CPT | Mod: GP

## 2022-01-13 PROCEDURE — 97112 NEUROMUSCULAR REEDUCATION: CPT | Mod: GP

## 2022-01-21 ENCOUNTER — THERAPY VISIT (OUTPATIENT)
Dept: PHYSICAL THERAPY | Facility: CLINIC | Age: 52
End: 2022-01-21
Payer: COMMERCIAL

## 2022-01-21 DIAGNOSIS — M54.42 ACUTE LEFT-SIDED LOW BACK PAIN WITH LEFT-SIDED SCIATICA: ICD-10-CM

## 2022-01-21 PROCEDURE — 97110 THERAPEUTIC EXERCISES: CPT | Mod: GP

## 2022-01-21 PROCEDURE — 97530 THERAPEUTIC ACTIVITIES: CPT | Mod: GP

## 2022-01-24 ENCOUNTER — VIRTUAL VISIT (OUTPATIENT)
Dept: ORTHOPEDICS | Facility: CLINIC | Age: 52
End: 2022-01-24
Payer: COMMERCIAL

## 2022-01-24 DIAGNOSIS — M54.42 ACUTE LEFT-SIDED LOW BACK PAIN WITH LEFT-SIDED SCIATICA: Primary | ICD-10-CM

## 2022-01-24 PROCEDURE — 99213 OFFICE O/P EST LOW 20 MIN: CPT | Mod: GT | Performed by: ORTHOPAEDIC SURGERY

## 2022-01-24 RX ORDER — METHYLPREDNISOLONE 4 MG
TABLET, DOSE PACK ORAL
Qty: 21 TABLET | Refills: 0 | Status: SHIPPED | OUTPATIENT
Start: 2022-01-24

## 2022-01-24 NOTE — NURSING NOTE
Reason For Visit:   Chief Complaint   Patient presents with     RECHECK     4 week follow up       Primary MD: No Ref-Primary, Physician  Ref. MD: Est    ?  No  Occupation .  Currently working? Yes.  Work status?  Full time.     Date of injury: 11/2021  Type of injury: possible push pull injury.     Date of surgery: NO  Type of surgery: No.     Smoker: No  Request smoking cessation information: No    There were no vitals taken for this visit.    Pain Assessment  Patient Currently in Pain: Yes  0-10 Pain Scale: 3  Primary Pain Location: Back    Oswestry (DESTINY) Questionnaire    OSWESTRY DISABILITY INDEX 1/24/2022   Count 9   Sum 21   Oswestry Score (%) 46.67      Visual Analog Pain Scale  Back Pain Scale 0-10: 3  Right leg pain: 0  Left leg pain: 0  Neck Pain Scale 0-10: 0  Right arm pain: 0  Left arm pain: 0    Promis 10 Assessment    PROMIS 10 1/24/2022   In general, would you say your health is: Good   In general, would you say your quality of life is: Good   In general, how would you rate your physical health? Good   In general, how would you rate your mental health, including your mood and your ability to think? Good   In general, how would you rate your satisfaction with your social activities and relationships? Very good   In general, please rate how well you carry out your usual social activities and roles Very good   To what extent are you able to carry out your everyday physical activities such as walking, climbing stairs, carrying groceries, or moving a chair? Mostly   How often have you been bothered by emotional problems such as feeling anxious, depressed or irritable? Rarely   How would you rate your fatigue on average? Mild   How would you rate your pain on average?   0 = No Pain  to  10 = Worst Imaginable Pain 4   In general, would you say your health is: 3   In general, would you say your quality of life is: 3   In general, how would you rate your physical health? 3   In general,  how would you rate your mental health, including your mood and your ability to think? 3   In general, how would you rate your satisfaction with your social activities and relationships? 4   In general, please rate how well you carry out your usual social activities and roles. (This includes activities at home, at work and in your community, and responsibilities as a parent, child, spouse, employee, friend, etc.) 4   To what extent are you able to carry out your everyday physical activities such as walking, climbing stairs, carrying groceries, or moving a chair? 4   In the past 7 days, how often have you been bothered by emotional problems such as feeling anxious, depressed, or irritable? 2   In the past 7 days, how would you rate your fatigue on average? 2   In the past 7 days, how would you rate your pain on average, where 0 means no pain, and 10 means worst imaginable pain? 4   Global Mental Health Score 14   Global Physical Health Score 14   PROMIS TOTAL - SUBSCORES 28          Eladia MAYANK Hernandez    Oliverio is a 51 year old who is being evaluated via a billable video visit.      How would you like to obtain your AVS? MyChart  If the video visit is dropped, the invitation should be resent by: Text to cell phone: [phone]  Will anyone else be joining your video visit? No

## 2022-01-24 NOTE — LETTER
Date:January 25, 2022      Patient was self referred, no letter generated. Do not send.        [facility] Health Information

## 2022-01-24 NOTE — PROGRESS NOTES
Oliverio is a 51 year old who is being evaluated via a billable video visit.            Video Start Time: 12:04        Subjective       Denies any symptoms concerning for cauda equina syndrome.    HPI   Oliverio is a 51 year old who presents for follow-up of L4 radiculopathy.  Since her last visit in December he has been diligent with his physical therapy.  He has noted continual improvement at this point is walking standing with very little to no pain.  The pain he does have is relieved after sitting down.  He is transitioning with physical therapy to once every other week now.  Feels well enough that he is planning on a cruise in the next couple weeks.  What was biggest concerns is what he will do if he has recurrence of his severe symptoms while while he is on the cruise.    Review of Systems   Denies new or worsening numbness, paresthesia, weakness, bowel or bladder incontinence, saddle anesthesia      Objective           Vitals:  No vitals were obtained today due to virtual visit.      Physical Exam   He appears well and in no acute distress  Nonlabored respiration with no audible wheeze  Mentation appears normal    IMAGING REVIEW:   MRI of the lumbar spine notable for relatively maintained disc base without significant disc desiccation.  At L3-4 there is bilateral paracentral disc herniation and moderate facet effusion, moderate to severe bilateral neuroforaminal stenosis left greater than right. Mild to moderate lateral recess stenosis with traversing left L4 nerve root contacting disc in lateral recess.  At L4-5 disc bulge bilaterally resulting in moderate to severe foraminal stenosis compressing exiting L4 nerve root on left.    Assessment and plan:  51-year-old male with L3-4 and L4-5 degenerative spondylosis with stenosis and radicular symptoms in the left L4 distribution.  He has been improving over the last 8 weeks with conservative measures.  He would like to continue with nonoperative cares at this time.  In  order for him to have some type of treatment available while he is on a cruise I did offer prescribing a Medrol Dosepak which she can take with him and take if severe symptoms recurred.  He was agreeable to this.    We will continue with physical therapy on current regimen.  We will follow up with me on a as needed basis.  Reviewed callback criteria.        Video-Visit Details    Type of service:  Video Visit    Video End Time:12:13 PM    Originating Location (pt. Location): Home    Distant Location (provider location):  [facility] [place]     Platform used for Video Visit: Other: Canatu

## 2022-01-24 NOTE — LETTER
1/24/2022         RE: Carloz Palomo  [id number] [facility] [id number]        Dear Colleague,    Thank you for referring your patient, Carloz Palomo, to the [facility]. Please see a copy of my visit note below.    Oliverio is a 51 year old who is being evaluated via a billable video visit.            Video Start Time: 12:04        Subjective       Denies any symptoms concerning for cauda equina syndrome.    HPI   Oliverio is a 51 year old who presents for follow-up of L4 radiculopathy.  Since her last visit in December he has been diligent with his physical therapy.  He has noted continual improvement at this point is walking standing with very little to no pain.  The pain he does have is relieved after sitting down.  He is transitioning with physical therapy to once every other week now.  Feels well enough that he is planning on a cruise in the next couple weeks.  What was biggest concerns is what he will do if he has recurrence of his severe symptoms while while he is on the cruise.    Review of Systems   Denies new or worsening numbness, paresthesia, weakness, bowel or bladder incontinence, saddle anesthesia      Objective           Vitals:  No vitals were obtained today due to virtual visit.      Physical Exam   He appears well and in no acute distress  Nonlabored respiration with no audible wheeze  Mentation appears normal    IMAGING REVIEW:   MRI of the lumbar spine notable for relatively maintained disc base without significant disc desiccation.  At L3-4 there is bilateral paracentral disc herniation and moderate facet effusion, moderate to severe bilateral neuroforaminal stenosis left greater than right. Mild to moderate lateral recess stenosis with traversing left L4 nerve root contacting disc in lateral recess.  At L4-5 disc bulge bilaterally resulting in moderate to severe foraminal stenosis compressing exiting L4 nerve root on left.    Assessment and plan:  51-year-old male with  L3-4 and L4-5 degenerative spondylosis with stenosis and radicular symptoms in the left L4 distribution.  He has been improving over the last 8 weeks with conservative measures.  He would like to continue with nonoperative cares at this time.  In order for him to have some type of treatment available while he is on a cruise I did offer prescribing a Medrol Dosepak which she can take with him and take if severe symptoms recurred.  He was agreeable to this.    We will continue with physical therapy on current regimen.  We will follow up with me on a as needed basis.  Reviewed callback criteria.        Video-Visit Details    Type of service:  Video Visit    Video End Time:12:13 PM    Originating Location (pt. Location): Home    Distant Location (provider location):  [facility]     Platform used for Video Visit: Other: Diamond        Again, thank you for allowing me to participate in the care of your patient.        Sincerely,        LIBORIO PRASAD MD

## 2022-02-17 ENCOUNTER — THERAPY VISIT (OUTPATIENT)
Dept: PHYSICAL THERAPY | Facility: CLINIC | Age: 52
End: 2022-02-17
Payer: COMMERCIAL

## 2022-02-17 DIAGNOSIS — M54.42 ACUTE LEFT-SIDED LOW BACK PAIN WITH LEFT-SIDED SCIATICA: ICD-10-CM

## 2022-02-17 PROCEDURE — 97110 THERAPEUTIC EXERCISES: CPT | Mod: GP

## 2022-02-17 PROCEDURE — 97530 THERAPEUTIC ACTIVITIES: CPT | Mod: GP

## 2022-06-02 ENCOUNTER — E-VISIT (OUTPATIENT)
Dept: PEDIATRICS | Facility: CLINIC | Age: 52
End: 2022-06-02
Payer: COMMERCIAL

## 2022-06-02 DIAGNOSIS — Z86.16 HISTORY OF COVID-19: Primary | ICD-10-CM

## 2022-06-02 PROCEDURE — 99207 PR NO BILLABLE SERVICE THIS VISIT: CPT | Performed by: PHYSICIAN ASSISTANT

## 2022-06-02 NOTE — PATIENT INSTRUCTIONS
2022    RE:  Carloz Palomo                                                                                                                                                       [id number] [facility] [id number]        To whom it may concern:    Carloz Palomo tested positive for Covid on 22.  He is now asymptomatic and fit to travel as the 10 day quarantine period has .       Sincerely,  Perla Mcconnell PA-C

## 2022-06-02 NOTE — TELEPHONE ENCOUNTER
Provider E-Visit time total (minutes): 5    5/12/22 tested positive.   He is currently ASYMPTOMATIC and would like a letter for travel.    I called patient to confirm that he is asymptomatic.  Phone call lasted 4 minutes  Perla Mcconnell PA-C

## 2022-06-07 ENCOUNTER — TELEPHONE (OUTPATIENT)
Dept: FAMILY MEDICINE | Facility: CLINIC | Age: 52
End: 2022-06-07
Payer: COMMERCIAL

## 2022-06-07 NOTE — TELEPHONE ENCOUNTER
Patient Quality Outreach    Patient is due for the following:   Colon Cancer Screening -  Colonoscopy  Physical  - Now    NEXT STEPS:   Schedule a office visit for Colonoscopy yearly physical    Type of outreach:    Sent [employer] message.    Next Steps:  Reach out within 90 days via [employer].    Max number of attempts reached: No. Will try again in 90 days if patient still on fail list.    Questions for provider review:    None     Antoinette Amaya MA

## 2022-07-09 DIAGNOSIS — I10 HYPERTENSION GOAL BP (BLOOD PRESSURE) < 140/90: ICD-10-CM

## 2022-07-11 RX ORDER — LISINOPRIL AND HYDROCHLOROTHIAZIDE 20; 25 MG/1; MG/1
TABLET ORAL
Qty: 90 TABLET | Refills: 1 | Status: SHIPPED | OUTPATIENT
Start: 2022-07-11

## 2022-10-03 ENCOUNTER — HEALTH MAINTENANCE LETTER (OUTPATIENT)
Age: 52
End: 2022-10-03

## 2023-01-07 DIAGNOSIS — I10 HYPERTENSION GOAL BP (BLOOD PRESSURE) < 140/90: ICD-10-CM

## 2023-01-10 RX ORDER — LISINOPRIL AND HYDROCHLOROTHIAZIDE 20; 25 MG/1; MG/1
TABLET ORAL
Qty: 90 TABLET | Refills: 1 | OUTPATIENT
Start: 2023-01-10

## 2023-02-11 ENCOUNTER — HEALTH MAINTENANCE LETTER (OUTPATIENT)
Age: 53
End: 2023-02-11

## 2024-03-09 ENCOUNTER — HEALTH MAINTENANCE LETTER (OUTPATIENT)
Age: 54
End: 2024-03-09

## 2025-03-16 ENCOUNTER — HEALTH MAINTENANCE LETTER (OUTPATIENT)
Age: 55
End: 2025-03-16

## 2025-08-28 RX ORDER — METOPROLOL SUCCINATE 100 MG/1
100 TABLET, EXTENDED RELEASE ORAL DAILY
COMMUNITY
Start: 2025-08-24

## 2025-08-28 RX ORDER — ROSUVASTATIN CALCIUM 20 MG/1
20 TABLET, COATED ORAL AT BEDTIME
COMMUNITY
Start: 2025-08-23

## 2025-08-28 RX ORDER — AMLODIPINE BESYLATE 5 MG/1
5 TABLET ORAL DAILY
COMMUNITY
Start: 2025-08-24

## 2025-08-28 RX ORDER — CHLORTHALIDONE 25 MG/1
25 TABLET ORAL DAILY
COMMUNITY
Start: 2025-08-24

## 2025-08-28 RX ORDER — LOSARTAN POTASSIUM 50 MG/1
50 TABLET ORAL DAILY
COMMUNITY
Start: 2025-08-24

## 2025-08-29 ENCOUNTER — OFFICE VISIT (OUTPATIENT)
Dept: FAMILY MEDICINE | Facility: CLINIC | Age: 55
End: 2025-08-29
Payer: COMMERCIAL

## 2025-08-30 LAB
ANION GAP SERPL CALCULATED.3IONS-SCNC: 13 MMOL/L (ref 7–15)
BUN SERPL-MCNC: 15.4 MG/DL (ref 6–20)
CALCIUM SERPL-MCNC: 9.5 MG/DL (ref 8.8–10.4)
CHLORIDE SERPL-SCNC: 98 MMOL/L (ref 98–107)
CREAT SERPL-MCNC: 1.02 MG/DL (ref 0.67–1.17)
EGFRCR SERPLBLD CKD-EPI 2021: 86 ML/MIN/1.73M2
GLUCOSE SERPL-MCNC: 118 MG/DL (ref 70–99)
HCO3 SERPL-SCNC: 26 MMOL/L (ref 22–29)
HCV AB SERPL QL IA: NONREACTIVE
HIV 1+2 AB+HIV1 P24 AG SERPL QL IA: NONREACTIVE
POTASSIUM SERPL-SCNC: 4.6 MMOL/L (ref 3.4–5.3)
SODIUM SERPL-SCNC: 137 MMOL/L (ref 135–145)